# Patient Record
Sex: FEMALE | Race: WHITE | NOT HISPANIC OR LATINO | URBAN - METROPOLITAN AREA
[De-identification: names, ages, dates, MRNs, and addresses within clinical notes are randomized per-mention and may not be internally consistent; named-entity substitution may affect disease eponyms.]

---

## 2022-12-19 NOTE — ASU PATIENT PROFILE, ADULT - NS PREOP UNDERSTANDS INFO
yes No solid foods, including dairy, candy, lozenges or chewing gum after 10:00pm, Can have clear liquids- water, apple juice, clear gatorade, powerade or pedialyte up until 7:00am. No smoking, alcohol, or drug use tonight. Please bring photo id, insurance card and vaccine card with you. Your escort has to be 18 years and older, they must bring photo ID.  [facility] is located at [address] [place] [address], between [address] Telephone # [phone]. Please take  meds for HTN, heart disease, seizure, thyroid, anxiety or Parkinsons  in the morning with a sip of water., if applicable./yes

## 2022-12-19 NOTE — ASU PATIENT PROFILE, ADULT - VISION (WITH CORRECTIVE LENSES IF THE PATIENT USUALLY WEARS THEM):
Normal vision: sees adequately in most situations; can see medication labels, newsprint readers/Partially impaired: cannot see medication labels or newsprint, but can see obstacles in path, and the surrounding layout; can count fingers at arm's length

## 2022-12-20 ENCOUNTER — OUTPATIENT (OUTPATIENT)
Dept: OUTPATIENT SERVICES | Facility: HOSPITAL | Age: 62
LOS: 1 days | Discharge: HOME | End: 2022-12-20

## 2022-12-20 VITALS
DIASTOLIC BLOOD PRESSURE: 66 MMHG | OXYGEN SATURATION: 98 % | SYSTOLIC BLOOD PRESSURE: 115 MMHG | HEART RATE: 62 BPM | TEMPERATURE: 97 F | RESPIRATION RATE: 16 BRPM

## 2022-12-20 VITALS
HEIGHT: 64 IN | RESPIRATION RATE: 14 BRPM | DIASTOLIC BLOOD PRESSURE: 62 MMHG | HEART RATE: 55 BPM | TEMPERATURE: 97 F | SYSTOLIC BLOOD PRESSURE: 112 MMHG | WEIGHT: 131.4 LBS | OXYGEN SATURATION: 98 %

## 2022-12-20 DIAGNOSIS — Z90.49 ACQUIRED ABSENCE OF OTHER SPECIFIED PARTS OF DIGESTIVE TRACT: Chronic | ICD-10-CM

## 2022-12-20 DIAGNOSIS — Z98.82 BREAST IMPLANT STATUS: Chronic | ICD-10-CM

## 2022-12-20 DEVICE — IMPLANTABLE DEVICE
Type: IMPLANTABLE DEVICE | Status: NON-FUNCTIONAL
Removed: 2022-12-20

## 2022-12-20 RX ORDER — ACETAMINOPHEN 500 MG
1000 TABLET ORAL ONCE
Refills: 0 | Status: DISCONTINUED | OUTPATIENT
Start: 2022-12-20 | End: 2022-12-20

## 2022-12-20 RX ORDER — PROGESTERONE 200 MG/1
0 CAPSULE, LIQUID FILLED ORAL
Qty: 0 | Refills: 0 | DISCHARGE

## 2022-12-20 RX ORDER — SODIUM CHLORIDE 9 MG/ML
500 INJECTION, SOLUTION INTRAVENOUS
Refills: 0 | Status: DISCONTINUED | OUTPATIENT
Start: 2022-12-20 | End: 2022-12-20

## 2022-12-20 RX ORDER — HYDROMORPHONE HYDROCHLORIDE 2 MG/ML
0.5 INJECTION INTRAMUSCULAR; INTRAVENOUS; SUBCUTANEOUS
Refills: 0 | Status: DISCONTINUED | OUTPATIENT
Start: 2022-12-20 | End: 2022-12-20

## 2022-12-20 RX ORDER — FENTANYL CITRATE 50 UG/ML
50 INJECTION INTRAVENOUS
Refills: 0 | Status: DISCONTINUED | OUTPATIENT
Start: 2022-12-20 | End: 2022-12-20

## 2022-12-20 RX ORDER — SODIUM CHLORIDE 9 MG/ML
500 INJECTION, SOLUTION INTRAVENOUS ONCE
Refills: 0 | Status: COMPLETED | OUTPATIENT
Start: 2022-12-20 | End: 2022-12-20

## 2022-12-20 RX ORDER — ESTRADIOL 4 UG/1
0 INSERT VAGINAL
Qty: 0 | Refills: 0 | DISCHARGE

## 2022-12-20 RX ORDER — BENZOCAINE AND MENTHOL 5; 1 G/100ML; G/100ML
1 LIQUID ORAL DAILY
Refills: 0 | Status: DISCONTINUED | OUTPATIENT
Start: 2022-12-20 | End: 2022-12-20

## 2022-12-20 RX ADMIN — SODIUM CHLORIDE 500 MILLILITER(S): 9 INJECTION, SOLUTION INTRAVENOUS at 10:47

## 2022-12-20 RX ADMIN — BENZOCAINE AND MENTHOL 1 LOZENGE: 5; 1 LIQUID ORAL at 12:00

## 2022-12-20 NOTE — ASU DISCHARGE PLAN (ADULT/PEDIATRIC) - ASU DC SPECIAL INSTRUCTIONSFT
Instructions given pre op from private office. Please call office with any questions or concerns.    Okay to shower in 24 hours.

## 2022-12-20 NOTE — ASU PREOP CHECKLIST - IV STARTED
Jessica Kumari RT (R)  Letter sent also,   contacted Kelvin on 12/04/20 and left a message. If patient calls back please inform patient of results below, thanks    Please call with results.     Your diabetes screening lab results came back normal.     Please call the clinic with any questions you may have.     Have a great day,     Dr. Negron    not at present time

## 2022-12-20 NOTE — BRIEF OPERATIVE NOTE - NSICDXBRIEFPROCEDURE_GEN_ALL_CORE_FT
PROCEDURES:  Replacement, implant, breast, cosmetic 20-Dec-2022 10:22:45  Kate Zapata  Release of right breast 20-Dec-2022 10:22:54  Kate Zapata  Insertion, implant, silicone, breast 20-Dec-2022 10:23:05  Kate Zapata

## 2022-12-20 NOTE — ASU DISCHARGE PLAN (ADULT/PEDIATRIC) - NS MD DC FALL RISK RISK
For information on Fall & Injury Prevention, visit: [email or web address] OR  [email or web address] OR  https://www.cdc.gov/steadi/patient.html

## 2022-12-20 NOTE — PRE-ANESTHESIA EVALUATION ADULT - NSANTHADDINFOFT_GEN_ALL_CORE
Pt accepts all anesthesia risks IBNLT: Dental, Pharyngeal, Laryngeal, Tracheal Trauma, Sore Throat, Hoarseness, Recurrent Laryngeal Nerve Dysfunction, Upper and Lower Extremity Paresthesias, Nausea and Vomiting, Potential exacerbation of asthma and SHAMA.

## 2022-12-20 NOTE — BRIEF OPERATIVE NOTE - NSICDXBRIEFPREOP_GEN_ALL_CORE_FT
PRE-OP DIAGNOSIS:  Breast tenderness 20-Dec-2022 10:23:21  Kate Zapata  H/O breast augmentation 20-Dec-2022 10:23:28  Kate Zapata  Ptosis of breast 20-Dec-2022 10:23:35  Kate Zapata

## 2022-12-20 NOTE — PRE-ANESTHESIA EVALUATION ADULT - NSANTHOSAYNRD_GEN_A_CORE
No. SHAMA screening performed.  STOP BANG Legend: 0-2 = LOW Risk; 3-4 = INTERMEDIATE Risk; 5-8 = HIGH Risk

## (undated) DEVICE — DRSG KERLIX ROLL 4.5"

## (undated) DEVICE — TUBING MICROAIRE ASPIRATION SET 12FT

## (undated) DEVICE — ELCTR STRYKER NEPTUNE SMOKE EVACUATION PENCIL (GREEN)

## (undated) DEVICE — STAPLER SKIN VISI-STAT 35 WIDE

## (undated) DEVICE — SLV COMPRESSION KNEE MED

## (undated) DEVICE — DRSG SURGICAL BRA MED 36-38

## (undated) DEVICE — SUT VICRYL 3-0 18" PS-2 UNDYED

## (undated) DEVICE — SUT VICRYL 3-0 27" PS-2 UNDYED

## (undated) DEVICE — APPLICATOR COTTON TIP 6"

## (undated) DEVICE — GOWN ROYAL SILK XL

## (undated) DEVICE — BLADE SURGICAL #15 CARBON

## (undated) DEVICE — DRSG SURGICAL BRA LG 38-40

## (undated) DEVICE — GLV 7.5 PROTEXIS (WHITE)

## (undated) DEVICE — ELCTR BOVIE TIP BLADE INSULATED 2.75" EDGE

## (undated) DEVICE — DRSG GAUZE FLUFF 1PLY 18X30"

## (undated) DEVICE — ONETRAC LIGHTED RETRACTOR 135 X 30MM DISP

## (undated) DEVICE — SUT VICRYL 2-0 27" CT-1

## (undated) DEVICE — BAG DECANTER IV STERILE

## (undated) DEVICE — SUT STRATAFIX SPIRAL MONOCRYL PLUS 3-0 30CM PS-2 UNDYED

## (undated) DEVICE — POSITIONER FOAM EGG CRATE ULNAR 2PCS (PINK)

## (undated) DEVICE — DRAPE MEDIUM SHEET 44" X 70"

## (undated) DEVICE — ELCTR BOVIE PENCIL SMOKE EVACUATION

## (undated) DEVICE — WARMING BLANKET LOWER ADULT

## (undated) DEVICE — GLV 6.5 PROTEXIS (WHITE)

## (undated) DEVICE — SUT PROLENE 4-0 18" PS-2

## (undated) DEVICE — DRSG STERISTRIPS 1 X 5"

## (undated) DEVICE — ELCTR PENCIL SMOKE EVACUATOR COATED PUSH BUTTON 70MM

## (undated) DEVICE — GOWN SLEEVES

## (undated) DEVICE — DRSG TELFA 3 X 8

## (undated) DEVICE — DRSG ACE BANDAGE 6"

## (undated) DEVICE — MARKING PEN W RULER

## (undated) DEVICE — PREP BETADINE SPONGE STICKS

## (undated) DEVICE — DRSG STERISTRIPS 0.5 X 4"

## (undated) DEVICE — SUT PDS II 2-0 27" CT-1

## (undated) DEVICE — ELCTR STRYKER BLADE COATED INSULATED 165MM

## (undated) DEVICE — SUT PDS II 3-0 18" PS-2 UNDYED

## (undated) DEVICE — TOURNIQUET ESMARK 4"

## (undated) DEVICE — PACK BREAST

## (undated) DEVICE — SUT PDS II 4-0 18" PS-2 UNDYED

## (undated) DEVICE — ELCTR STRYKER NEPTUNE BLADE COATED, INSULATED 70MM

## (undated) DEVICE — DRSG GAUZE MOISTURIZER 0.5 OZ 4X8

## (undated) DEVICE — DRSG MASTISOL

## (undated) DEVICE — SUT MONOCRYL 3-0 18" PS-1

## (undated) DEVICE — DRAPE LIGHT HANDLE COVER (GREEN)

## (undated) DEVICE — DRAPE TOWEL BLUE 17" X 24"

## (undated) DEVICE — ONETRAC LIGHTED RETRACTOR 90 X 22MM DISP

## (undated) DEVICE — SUT SILK 3-0 30" SH

## (undated) DEVICE — SUT MONOCRYL 4-0 27" PS-2 UNDYED

## (undated) DEVICE — SUT PLAIN GUT 4-0 27" FS-2

## (undated) DEVICE — DRSG DERMABOND PRINEO 60CM

## (undated) DEVICE — DRSG ACE BANDAGE 4"

## (undated) DEVICE — DRSG COMBINE 5X9"

## (undated) DEVICE — SYR CONTROL LUER LOK 10CC

## (undated) DEVICE — KELLER FUNNEL

## (undated) DEVICE — DRAIN RESERVOIR FOR JACKSON PRATT 100CC CARDINAL